# Patient Record
Sex: FEMALE | Race: WHITE
[De-identification: names, ages, dates, MRNs, and addresses within clinical notes are randomized per-mention and may not be internally consistent; named-entity substitution may affect disease eponyms.]

---

## 2018-02-25 ENCOUNTER — HOSPITAL ENCOUNTER (EMERGENCY)
Dept: HOSPITAL 63 - ER | Age: 68
Discharge: HOME | End: 2018-02-25
Payer: MEDICARE

## 2018-02-25 VITALS
SYSTOLIC BLOOD PRESSURE: 122 MMHG | SYSTOLIC BLOOD PRESSURE: 122 MMHG | SYSTOLIC BLOOD PRESSURE: 122 MMHG | DIASTOLIC BLOOD PRESSURE: 55 MMHG | DIASTOLIC BLOOD PRESSURE: 55 MMHG | DIASTOLIC BLOOD PRESSURE: 55 MMHG | DIASTOLIC BLOOD PRESSURE: 55 MMHG | SYSTOLIC BLOOD PRESSURE: 122 MMHG

## 2018-02-25 VITALS — BODY MASS INDEX: 18.61 KG/M2 | WEIGHT: 94.8 LBS | HEIGHT: 60 IN

## 2018-02-25 DIAGNOSIS — Z88.0: ICD-10-CM

## 2018-02-25 DIAGNOSIS — Z87.891: ICD-10-CM

## 2018-02-25 DIAGNOSIS — M94.0: ICD-10-CM

## 2018-02-25 DIAGNOSIS — I10: ICD-10-CM

## 2018-02-25 DIAGNOSIS — F32.9: ICD-10-CM

## 2018-02-25 DIAGNOSIS — E87.6: ICD-10-CM

## 2018-02-25 DIAGNOSIS — J44.1: Primary | ICD-10-CM

## 2018-02-25 DIAGNOSIS — G25.81: ICD-10-CM

## 2018-02-25 DIAGNOSIS — E78.5: ICD-10-CM

## 2018-02-25 LAB
ALBUMIN SERPL-MCNC: 3.4 G/DL (ref 3.4–5)
ALBUMIN/GLOB SERPL: 0.8 {RATIO} (ref 1–1.7)
ALP SERPL-CCNC: 115 U/L (ref 46–116)
ALT SERPL-CCNC: 38 U/L (ref 14–59)
ANION GAP SERPL CALC-SCNC: 10 MMOL/L (ref 6–14)
AST SERPL-CCNC: 35 U/L (ref 15–37)
BASOPHILS # BLD AUTO: 0 X10^3/UL (ref 0–0.2)
BASOPHILS NFR BLD: 0 % (ref 0–3)
BILIRUB SERPL-MCNC: 0.4 MG/DL (ref 0.2–1)
BUN/CREAT SERPL: 19 (ref 6–20)
CA-I SERPL ISE-MCNC: 19 MG/DL (ref 7–20)
CALCIUM SERPL-MCNC: 9 MG/DL (ref 8.5–10.1)
CHLORIDE SERPL-SCNC: 99 MMOL/L (ref 98–107)
CO2 SERPL-SCNC: 26 MMOL/L (ref 21–32)
CREAT SERPL-MCNC: 1 MG/DL (ref 0.6–1)
EOSINOPHIL NFR BLD: 0 % (ref 0–3)
EOSINOPHIL NFR BLD: 0 X10^3/UL (ref 0–0.7)
ERYTHROCYTE [DISTWIDTH] IN BLOOD BY AUTOMATED COUNT: 12.9 % (ref 11.5–14.5)
GFR SERPLBLD BASED ON 1.73 SQ M-ARVRAT: 55.6 ML/MIN
GLOBULIN SER-MCNC: 4.5 G/DL (ref 2.2–3.8)
GLUCOSE SERPL-MCNC: 114 MG/DL (ref 70–99)
HCT VFR BLD CALC: 37.9 % (ref 36–47)
HGB BLD-MCNC: 12.8 G/DL (ref 12–15.5)
INFLUENZA A PATIENT: NEGATIVE
INFLUENZA B PATIENT: NEGATIVE
LYMPHOCYTES # BLD: 0.8 X10^3/UL (ref 1–4.8)
LYMPHOCYTES NFR BLD AUTO: 8 % (ref 24–48)
MCH RBC QN AUTO: 30 PG (ref 25–35)
MCHC RBC AUTO-ENTMCNC: 34 G/DL (ref 31–37)
MCV RBC AUTO: 89 FL (ref 79–100)
MONO #: 1.3 X10^3/UL (ref 0–1.1)
MONOCYTES NFR BLD: 12 % (ref 0–9)
NEUT #: 8.4 X10^3UL (ref 1.8–7.7)
NEUTROPHILS NFR BLD AUTO: 80 % (ref 31–73)
PLATELET # BLD AUTO: 399 X10^3/UL (ref 140–400)
POTASSIUM SERPL-SCNC: 3.3 MMOL/L (ref 3.5–5.1)
PROT SERPL-MCNC: 7.9 G/DL (ref 6.4–8.2)
RBC # BLD AUTO: 4.24 X10^6/UL (ref 3.5–5.4)
SODIUM SERPL-SCNC: 135 MMOL/L (ref 136–145)
WBC # BLD AUTO: 10.6 X10^3/UL (ref 4–11)

## 2018-02-25 PROCEDURE — 83880 ASSAY OF NATRIURETIC PEPTIDE: CPT

## 2018-02-25 PROCEDURE — 80053 COMPREHEN METABOLIC PANEL: CPT

## 2018-02-25 PROCEDURE — 93005 ELECTROCARDIOGRAM TRACING: CPT

## 2018-02-25 PROCEDURE — 99285 EMERGENCY DEPT VISIT HI MDM: CPT

## 2018-02-25 PROCEDURE — 83605 ASSAY OF LACTIC ACID: CPT

## 2018-02-25 PROCEDURE — 87804 INFLUENZA ASSAY W/OPTIC: CPT

## 2018-02-25 PROCEDURE — 87040 BLOOD CULTURE FOR BACTERIA: CPT

## 2018-02-25 PROCEDURE — 85379 FIBRIN DEGRADATION QUANT: CPT

## 2018-02-25 PROCEDURE — 71045 X-RAY EXAM CHEST 1 VIEW: CPT

## 2018-02-25 PROCEDURE — 85025 COMPLETE CBC W/AUTO DIFF WBC: CPT

## 2018-02-25 PROCEDURE — 36415 COLL VENOUS BLD VENIPUNCTURE: CPT

## 2018-02-25 PROCEDURE — 96374 THER/PROPH/DIAG INJ IV PUSH: CPT

## 2018-02-25 PROCEDURE — 84484 ASSAY OF TROPONIN QUANT: CPT

## 2018-02-25 PROCEDURE — 96375 TX/PRO/DX INJ NEW DRUG ADDON: CPT

## 2018-02-25 NOTE — PHYS DOC
General


Chief Complaint:  COUGH


Stated Complaint:  CHEST WALL PAIN


Time Seen by MD:  02:28


Source:  patient


Exam Limitations:  clinical condition


Problems:  





History of Present Illness


Initial Comments


66 y/o female to ED complains of left-sided chest wall pain and worsening cough.


Patient has history of COPD and asthma, states she's been dealing with a cough 

now for several days. She ran out of DuoNeb treatments and Tessalon Perles are 

not helping. Tonight after a severe coughing spell she felt severe sharp pain 

between her ribs at the left side of her chest worse with deep breaths and 

certain movements. She's been struggling with dyspnea but denies any new 

symptoms tonight, no fever chills diaphoresis nausea vomiting arm or neck 

symptoms. On ED arrival vital signs are stable and although symptoms appear to 

be musculoskeletal full workup initiated.


Timing/Duration:  1 week


Severity:  severe


Modifying Factors:  worse with movement, improves with rest, improves with other


Associated Symptoms:  cough, shortness of breath, other


Allergies:  


Coded Allergies:  


     Penicillins (Verified  Allergy, Unknown, 2/25/18)





Past Medical History


Medical History:  COPD, other (depression, hypertension, asthma, restless leg, 

hyperlipidemia)


Surgical History:  other





Social History


Smoker:  quit greater than 1 year (quit smoking 16 years ago)


Alcohol:  rarely


Drugs:  none





Review of Systems


Constitutional:  denies chills, denies diaphoresis, denies fever, denies malaise


Respiratory:  see HPI, cough, shortness of breath, wheezing


Cardiovascular:  chest pain, denies palpitations, denies syncope


Gastrointestinal:  denies abdominal pain, denies nausea, denies vomiting


Musculoskeletal:  see HPI


Psychiatric/Neurological:  denies headache, denies numbness, denies paresthesia


Hematologic/Lymphatic:  denies blood clots, denies easy bleeding, denies easy 

bruising





Physical Exam


General Appearance:  moderate distress (severe coughing episodes and left-sided 

chest wall pain)


Ear, Nose, Throat:  hearing grossly normal, normal ENT inspection (dry membranes

)


Neck:  non-tender, supple


Respiratory:  decreased breath sounds, wheezing, other (point tender at the 

costal cartilage left side sixth rib interspace identically reproducing chief 

complaint)


Cardiovascular:  normal peripheral pulses, regular rate, rhythm


Gastrointestinal:  non tender, soft


Back:  no CVA tenderness, no vertebral tenderness


Extremities:  non-tender, normal inspection


Neurologic/Psychiatric:  CNs II-XII nml as tested, no motor/sensory deficits, 

alert, oriented x 3





Orders, Labs, Meds


K+ 3.3, 20meq KCL given PO





AP chest: Hyperinflation with COPD changes noted no discrete infiltrates 

interpreted by me.





Labs unremarkable





0436:  Patient rechecked, cough/wheeze resolved completely with solumedrol.  

Costal cartilage pain resolved with toradol patient states it is 1/10.  

Requesting discharge. I discussed prescription and over-the-counter medications 

as well as home medications. Discussed close PCP follow-up patient's questions 

were answered she expressed agreement and understanding with the treatment plan.


Departure


Time of Disposition:  04:35


Disposition:  01 HOME, SELF-CARE


Diagnosis:  COPD exacerbation, Costochondritis, hypokalem


Condition:  IMPROVED


Patient Instructions:  Costochondritis, Easy-to-Read, Hypokalemia-Brief





Additional Instructions:  


Please review the patient education materials given by ED staff.


ED one banana daily to supplement potassium.


Use home albuterol as needed for cough.


Prescription: Prednisone, DuoNeb, guaifenesin with codeine syrup


Follow-up with your doctor in 3-5 days for recheck.


Return to ED with new or changing symptoms.











SHERLY CONNOLLY DO Feb 25, 2018 02:35

## 2018-02-25 NOTE — RAD
Indication: Chest pain, cough.



Technique: Portable AP upright chest x-ray



Comparison: Previous study from 7/18/2016



Findings:

Heart is normal in size.  Lungs are hyperinflated.  Diffuse bilateral coarse

interstitial opacities noted without focal consolidation.  Redemonstrated are

couple of small radiopaque nodules in the right midlung zone also seen on

previous study from 2016.  No pneumothorax or pleural effusion.  Visualized

bony thorax is within normal limits.



Impression:

1.  No acute cardiopulmonary process.  Chronic interstitial changes. 

Superimposed interstitial infection not ruled out.

2.  Stable couple of nodules in the right midlung zone also seen on previous

CT chest from study from 2014 compatible with calcified granulomas.

## 2018-02-25 NOTE — EKG
[facility] [address], [place] [address]

Test Date:    2018               Test Time:    03:05:48

Pat Name:     RYNE BERTRAND           Department:   

Patient ID:   [id number]           Room:          

Gender:       F                        Technician:   JIM

:          1950               Requested By: SHERLY CONNOLLY

Order Number: [id number]            Reading MD:     

                                 Measurements

Intervals                              Axis          

Rate:         90                       P:            67

MT:           116                      QRS:          85

QRSD:         112                      T:            23

QT:           380                                    

QTc:          469                                    

                           Interpretive Statements

SINUS RHYTHM

R-S TRANSITION ZONE IN V LEADS DISPLACED TO THE RIGHT

QRS(T) CONTOUR ABNORMALITY

CONSIDER ANTEROLATERAL MYOCARDIAL DAMAGE

T ABNORMALITY IN ANTEROSEPTAL LEADS

ABNORMAL ECG

RI6.01

No previous ECG available for comparison

## 2018-08-27 ENCOUNTER — HOSPITAL ENCOUNTER (OUTPATIENT)
Dept: HOSPITAL 63 - DXRAD | Age: 68
Discharge: HOME | End: 2018-08-27
Attending: FAMILY MEDICINE
Payer: COMMERCIAL

## 2018-08-27 DIAGNOSIS — J43.9: Primary | ICD-10-CM

## 2018-08-27 DIAGNOSIS — E78.5: ICD-10-CM

## 2018-08-27 DIAGNOSIS — Z87.891: ICD-10-CM

## 2018-08-27 DIAGNOSIS — I10: ICD-10-CM

## 2018-08-27 PROCEDURE — 71046 X-RAY EXAM CHEST 2 VIEWS: CPT

## 2018-08-28 NOTE — RAD
Chest, 2 views, 8/27/2018:

 

HISTORY: Unexplained weight loss

 

Comparison is made to a study from 2/25/2018. The heart size is normal. 

There are emphysematous changes in the lungs with mild parenchymal 

scarring. A nodule projected over the anterior aspect of the right lung on

the lateral view is unchanged since 7/18/2016. The CT study from 5/1/2014 

shows that this is a bilobed nodule with areas of dense central 

calcification compatible with old granulomatous disease. No acute 

infiltrate is seen. There is no evidence of pleural fluid. Moderate 

scattered spurs are present in the spine.

 

IMPRESSION:

1. Emphysema.

2. Old healed granulomatous disease in the chest.

3. No acute abnormality is detected.

 

Electronically signed by: Rick Moritz, MD (8/28/2018 8:18 AM) [facility]

## 2018-10-01 ENCOUNTER — HOSPITAL ENCOUNTER (OUTPATIENT)
Dept: HOSPITAL 63 - PMG | Age: 68
Discharge: HOME | End: 2018-10-01
Attending: FAMILY MEDICINE
Payer: COMMERCIAL

## 2018-10-01 DIAGNOSIS — R20.0: Primary | ICD-10-CM

## 2018-10-01 PROCEDURE — 73502 X-RAY EXAM HIP UNI 2-3 VIEWS: CPT

## 2018-10-01 PROCEDURE — 73552 X-RAY EXAM OF FEMUR 2/>: CPT

## 2018-10-01 NOTE — RAD
EXAM: Left hip, 2 views; left femur, 2 views.

 

HISTORY: Pain and numbness.

 

COMPARISON: None.

 

FINDINGS: Frontal and frog-leg views left hip and frontal and lateral 

views left femur are obtained. There is no fracture, dislocation or 

subluxation. No lytic or sclerotic osseous lesion is seen. There is no 

periosteal reaction.

 

IMPRESSION: No acute osseous finding.

 

Electronically signed by: Leonora Sawant MD (10/1/2018 1:33 PM) [place] [id number]

## 2018-10-01 NOTE — RAD
EXAM: Left hip, 2 views; left femur, 2 views.

 

HISTORY: Pain and numbness.

 

COMPARISON: None.

 

FINDINGS: Frontal and frog-leg views left hip and frontal and lateral 

views left femur are obtained. There is no fracture, dislocation or 

subluxation. No lytic or sclerotic osseous lesion is seen. There is no 

periosteal reaction.

 

IMPRESSION: No acute osseous finding.

 

Electronically signed by: Leonora Sawant MD (10/1/2018 1:33 PM) [place] [id number]

## 2020-07-20 ENCOUNTER — HOSPITAL ENCOUNTER (OUTPATIENT)
Dept: HOSPITAL 63 - LAB | Age: 70
Discharge: HOME | End: 2020-07-20
Attending: NURSE ANESTHETIST, CERTIFIED REGISTERED
Payer: COMMERCIAL

## 2020-07-20 DIAGNOSIS — Z01.818: Primary | ICD-10-CM

## 2020-07-20 DIAGNOSIS — K22.70: ICD-10-CM

## 2020-07-20 DIAGNOSIS — Z11.59: ICD-10-CM

## 2020-07-20 PROCEDURE — 36415 COLL VENOUS BLD VENIPUNCTURE: CPT

## 2020-07-23 ENCOUNTER — HOSPITAL ENCOUNTER (OUTPATIENT)
Dept: HOSPITAL 63 - SURG | Age: 70
Discharge: HOME | End: 2020-07-23
Attending: INTERNAL MEDICINE
Payer: MEDICARE

## 2020-07-23 VITALS — DIASTOLIC BLOOD PRESSURE: 65 MMHG | SYSTOLIC BLOOD PRESSURE: 121 MMHG

## 2020-07-23 DIAGNOSIS — R63.4: ICD-10-CM

## 2020-07-23 DIAGNOSIS — K22.2: ICD-10-CM

## 2020-07-23 DIAGNOSIS — K22.70: ICD-10-CM

## 2020-07-23 DIAGNOSIS — K29.50: Primary | ICD-10-CM

## 2020-07-23 DIAGNOSIS — Z79.899: ICD-10-CM

## 2020-07-23 DIAGNOSIS — Z88.0: ICD-10-CM

## 2020-07-23 DIAGNOSIS — K44.9: ICD-10-CM

## 2020-07-23 PROCEDURE — 43239 EGD BIOPSY SINGLE/MULTIPLE: CPT

## 2020-07-23 PROCEDURE — 43450 DILATE ESOPHAGUS 1/MULT PASS: CPT

## 2020-11-02 ENCOUNTER — HOSPITAL ENCOUNTER (OUTPATIENT)
Dept: HOSPITAL 63 - ER | Age: 70
Setting detail: OBSERVATION
LOS: 2 days | Discharge: HOME | End: 2020-11-04
Attending: HOSPITALIST | Admitting: HOSPITALIST
Payer: COMMERCIAL

## 2020-11-02 VITALS — SYSTOLIC BLOOD PRESSURE: 141 MMHG | DIASTOLIC BLOOD PRESSURE: 57 MMHG

## 2020-11-02 VITALS — SYSTOLIC BLOOD PRESSURE: 104 MMHG | DIASTOLIC BLOOD PRESSURE: 57 MMHG

## 2020-11-02 VITALS — HEIGHT: 60 IN | WEIGHT: 73.63 LBS | BODY MASS INDEX: 14.46 KG/M2

## 2020-11-02 DIAGNOSIS — E78.5: ICD-10-CM

## 2020-11-02 DIAGNOSIS — F41.8: ICD-10-CM

## 2020-11-02 DIAGNOSIS — J44.9: ICD-10-CM

## 2020-11-02 DIAGNOSIS — R41.0: Primary | ICD-10-CM

## 2020-11-02 DIAGNOSIS — M19.90: ICD-10-CM

## 2020-11-02 DIAGNOSIS — I10: ICD-10-CM

## 2020-11-02 DIAGNOSIS — G30.9: ICD-10-CM

## 2020-11-02 DIAGNOSIS — K21.9: ICD-10-CM

## 2020-11-02 DIAGNOSIS — Z87.891: ICD-10-CM

## 2020-11-02 DIAGNOSIS — R44.3: ICD-10-CM

## 2020-11-02 DIAGNOSIS — Z98.891: ICD-10-CM

## 2020-11-02 DIAGNOSIS — R42: ICD-10-CM

## 2020-11-02 DIAGNOSIS — R53.1: ICD-10-CM

## 2020-11-02 DIAGNOSIS — F10.20: ICD-10-CM

## 2020-11-02 DIAGNOSIS — R62.7: ICD-10-CM

## 2020-11-02 DIAGNOSIS — R63.4: ICD-10-CM

## 2020-11-02 DIAGNOSIS — R29.6: ICD-10-CM

## 2020-11-02 DIAGNOSIS — E46: ICD-10-CM

## 2020-11-02 LAB
ALBUMIN SERPL-MCNC: 3.6 G/DL (ref 3.4–5)
ALBUMIN/GLOB SERPL: 1.1 {RATIO} (ref 1–1.7)
ALP SERPL-CCNC: 113 U/L (ref 46–116)
ALT SERPL-CCNC: 51 U/L (ref 14–59)
AMPHETAMINE/METHAMPHETAMINE: (no result)
ANION GAP SERPL CALC-SCNC: 8 MMOL/L (ref 6–14)
APTT PPP: (no result) S
AST SERPL-CCNC: 36 U/L (ref 15–37)
BACTERIA #/AREA URNS HPF: 0 /HPF
BARBITURATES UR-MCNC: (no result) UG/ML
BASOPHILS # BLD AUTO: 0 X10^3/UL (ref 0–0.2)
BASOPHILS NFR BLD: 1 % (ref 0–3)
BENZODIAZ UR-MCNC: (no result) UG/L
BILIRUB SERPL-MCNC: 0.2 MG/DL (ref 0.2–1)
BILIRUB UR QL STRIP: (no result)
BUN/CREAT SERPL: 26 (ref 6–20)
CA-I SERPL ISE-MCNC: 18 MG/DL (ref 7–20)
CALCIUM SERPL-MCNC: 9.2 MG/DL (ref 8.5–10.1)
CANNABINOIDS UR-MCNC: (no result) UG/L
CHLORIDE SERPL-SCNC: 105 MMOL/L (ref 98–107)
CO2 SERPL-SCNC: 30 MMOL/L (ref 21–32)
COCAINE UR-MCNC: (no result) NG/ML
CREAT SERPL-MCNC: 0.7 MG/DL (ref 0.6–1)
EOSINOPHIL NFR BLD: 0.3 X10^3/UL (ref 0–0.7)
EOSINOPHIL NFR BLD: 6 % (ref 0–3)
ERYTHROCYTE [DISTWIDTH] IN BLOOD BY AUTOMATED COUNT: 14.1 % (ref 11.5–14.5)
FIBRINOGEN PPP-MCNC: CLEAR MG/DL
GFR SERPLBLD BASED ON 1.73 SQ M-ARVRAT: 83.2 ML/MIN
GLOBULIN SER-MCNC: 3.4 G/DL (ref 2.2–3.8)
GLUCOSE SERPL-MCNC: 100 MG/DL (ref 70–99)
GLUCOSE UR STRIP-MCNC: (no result) MG/DL
HCT VFR BLD CALC: 41.4 % (ref 36–47)
HGB BLD-MCNC: 13.3 G/DL (ref 12–15.5)
LYMPHOCYTES # BLD: 1.1 X10^3/UL (ref 1–4.8)
LYMPHOCYTES NFR BLD AUTO: 24 % (ref 24–48)
MCH RBC QN AUTO: 30 PG (ref 25–35)
MCHC RBC AUTO-ENTMCNC: 32 G/DL (ref 31–37)
MCV RBC AUTO: 94 FL (ref 79–100)
METHADONE SERPL-MCNC: (no result) NG/ML
MONO #: 0.5 X10^3/UL (ref 0–1.1)
MONOCYTES NFR BLD: 12 % (ref 0–9)
NEUT #: 2.5 X10^3UL (ref 1.8–7.7)
NEUTROPHILS NFR BLD AUTO: 56 % (ref 31–73)
NITRITE UR QL STRIP: (no result)
OPIATES UR-MCNC: (no result) NG/ML
PCP SERPL-MCNC: (no result) MG/DL
PLATELET # BLD AUTO: 265 X10^3/UL (ref 140–400)
POTASSIUM SERPL-SCNC: 3.6 MMOL/L (ref 3.5–5.1)
PROT SERPL-MCNC: 7 G/DL (ref 6.4–8.2)
RBC # BLD AUTO: 4.4 X10^6/UL (ref 3.5–5.4)
RBC #/AREA URNS HPF: 0 /HPF (ref 0–2)
SODIUM SERPL-SCNC: 143 MMOL/L (ref 136–145)
SP GR UR STRIP: 1.02
SQUAMOUS #/AREA URNS LPF: (no result) /LPF
UROBILINOGEN UR-MCNC: 0.2 MG/DL
WBC # BLD AUTO: 4.4 X10^3/UL (ref 4–11)
WBC #/AREA URNS HPF: 0 /HPF (ref 0–4)

## 2020-11-02 PROCEDURE — 84484 ASSAY OF TROPONIN QUANT: CPT

## 2020-11-02 PROCEDURE — 99285 EMERGENCY DEPT VISIT HI MDM: CPT

## 2020-11-02 PROCEDURE — 81001 URINALYSIS AUTO W/SCOPE: CPT

## 2020-11-02 PROCEDURE — 96360 HYDRATION IV INFUSION INIT: CPT

## 2020-11-02 PROCEDURE — G0379 DIRECT REFER HOSPITAL OBSERV: HCPCS

## 2020-11-02 PROCEDURE — 94640 AIRWAY INHALATION TREATMENT: CPT

## 2020-11-02 PROCEDURE — 85025 COMPLETE CBC W/AUTO DIFF WBC: CPT

## 2020-11-02 PROCEDURE — 71045 X-RAY EXAM CHEST 1 VIEW: CPT

## 2020-11-02 PROCEDURE — 70450 CT HEAD/BRAIN W/O DYE: CPT

## 2020-11-02 PROCEDURE — 96361 HYDRATE IV INFUSION ADD-ON: CPT

## 2020-11-02 PROCEDURE — G0378 HOSPITAL OBSERVATION PER HR: HCPCS

## 2020-11-02 PROCEDURE — 80307 DRUG TEST PRSMV CHEM ANLYZR: CPT

## 2020-11-02 PROCEDURE — 93005 ELECTROCARDIOGRAM TRACING: CPT

## 2020-11-02 PROCEDURE — 36415 COLL VENOUS BLD VENIPUNCTURE: CPT

## 2020-11-02 PROCEDURE — 80053 COMPREHEN METABOLIC PANEL: CPT

## 2020-11-02 NOTE — PHYS DOC
Past History


Past Medical History:  COPD, Hypertension


Past Surgical History:  


Smoking:  Non-smoker


Alcohol Use:  Heavy


Drug Use:  None





General Adult


EDM:


Chief Complaint:  DIZZY/LIGHT HEADED





HPI:


HPI:


70-year-old female presents with dizziness and 2 falls today.  Patient tells me 

that she has had intermittent dizziness which she describes as a feeling of 

motion.  She has fallen down 2 times today.  She has gotten very weak and unable

to keep her self standing up.  She states that she had a similar episode to this

a year ago and they did not figure out what was causing it.  The patient states 

that she feels shaky all over.  She is wondering about Parkinson's.  There is no

family history of Parkinson's.  She has never been diagnosed with Parkinson's.  

She further admits to seeing moving shadows at night.  She knows that they are 

not there.  She does not describe any specific figures.  The patient is a daily 

alcohol drinker.  She tells me that she drinks 2 beers a day and the occasional 

bloody Violet.  Her last drink was yesterday.  She denies fever or chills.  Family

called the emergency room and is also concerned about Parkinson's.  They state 

she also has audible hallucinations.  Today family found her outside of the 

house where she lives with her son.  She was in the yard on the ground and could

not stand up.





Review of Systems:


Review of Systems:


Constitutional:  Denies fever or chills.  Generalized weakness.


Eyes:  Denies change in visual acuity 


HENT:  Denies nasal congestion or sore throat 


Respiratory:  Denies cough or shortness of breath 


Cardiovascular:  Denies chest pain or edema 


GI:  Denies abdominal pain, nausea, vomiting, bloody stools or diarrhea 


: Denies dysuria 


Musculoskeletal:  Denies back pain or joint pain 


Integument:  Denies rash 


Neurologic: Dizziness.  Denies headache, focal weakness or sensory changes 


Endocrine:  Denies polyuria or polydipsia 


Lymphatic:  Denies swollen glands 


Psychiatric:   anxiety





Current Medications:


Current Meds:





Current Medications








 Medications


  (Trade)  Dose


 Ordered  Sig/Tamia  Start Time


 Stop Time Status Last Admin


Dose Admin


 


 Sodium Chloride  1,000 ml @ 


 1,000 mls/hr  1X  ONCE  20 15:15


 20 16:14   














Allergies:


Allergies:





Allergies








Coded Allergies Type Severity Reaction Last Updated Verified


 


  Penicillins Allergy Unknown  18 Yes











Physical Exam:


PE:





Constitutional: Well developed, thin, no acute distress, non-toxic appearance. 

[]


HENT: Normocephalic, atraumatic, bilateral external ears normal, oropharynx 

moist, no oral exudates, nose normal. []


Eyes: PERRLA, EOMI, conjunctiva normal, no discharge. [] 


Neck: Normal range of motion, no tenderness, supple, no stridor. [] 


Cardiovascular: Heart rate regular rhythm, no murmur []


Lungs & Thorax:  Bilateral breath sounds clear to auscultation []


Abdomen: Bowel sounds normal, soft, no tenderness, no masses, no pulsatile m

asses. [] 


Skin: Warm, dry, no erythema, no rash. [] 


Back: No tenderness, no CVA tenderness. [] 


Extremities: No tenderness, no cyanosis, no clubbing, ROM intact, no edema. [] 


Neurologic: Alert and oriented X 3, normal motor function, normal sensory 

function, no focal deficits noted. []


Psychologic: Affect normal, judgement normal, mood anxious. []





Current Patient Data:


Vital Signs:





                                   Vital Signs








  Date Time  Temp Pulse Resp B/P (MAP) Pulse Ox O2 Delivery O2 Flow Rate FiO2


 


20 15:24 97.5 105 20 178/83 (114) 93 Room Air  











EKG:


EKG:


[]





Radiology/Procedures:


Radiology/Procedures:


[]


Impressions:


CT HEAD


 


INDICATION: Reason: recent fall, dizzy, etoh / Spl. Instructions:  / 


History: 


 


COMPARISON: None Available.


 


Exposure: One or more of the following individualized dose reduction 


techniques were utilized for this examination:  1. Automated exposure 


control  2. Adjustment of the mA and/or kV according to patient size  3. 


Use of iterative reconstruction technique


 


TECHNIQUE: 5 mm contiguous axial images were obtained from the skull base 


to the vertex in both bone and soft tissue algorithm.  


 


FINDINGS: 


 


No abnormal attenuation within the brain parenchyma.  


 


No evidence of acute intracranial hemorrhage.   No extra-axial fluid 


collections.


 


No mass effect or midline shift. Ventricular size is appropriate.  Basal 


cisterns are patent.


 


No fractures identified.Gray-white differentiation is preserved.Globes and


orbits are within normal limits.   Paranasal sinuses and mastoid air cells


are clear.   


 


IMPRESSION:


 


No acute intracranial findings. 


 


Electronically signed by: Kartik Lozada MD (2020 4:00 PM) [id number]














DICTATED AND SIGNED BY:     KARTIK LOZADA MD


DATE:     20 1600





CC: APRIL MAGAÑA DO; CIARAN WARE MD ~





Heart Score:


Risk Factors:


Risk Factors:  DM, Current or recent (<one month) smoker, HTN, HLP, family 

history of CAD, obesity.


Risk Scores:


Score 0 - 3:  2.5% MACE over next 6 weeks - Discharge Home


Score 4 - 6:  20.3% MACE over next 6 weeks - Admit for Clinical Observation


Score 7 - 10:  72.7% MACE over next 6 weeks - Early Invasive Strategies





Course & Med Decision Making:


Course & Med Decision Making


Pertinent Labs and Imaging studies reviewed. (See chart for details)


The patient's labs are unremarkable.  Her head CT is negative for acute 

findings.  Her urinalysis is negative for infection or drugs.  The patient's 

symptoms could have a central cause, but they could also be related to her 

alcohol consumption.  At this time it does not appear that she can function at 

home.  I spoke with the hospitalist, Dr. Moore and he has accepted the patient for 

admission.  He will have physical therapy and likely neurology see the patient 

for more in-depth evaluation of her mental status and hallucinations.


[]





Dragon Disclaimer:


Dragon Disclaimer:


This electronic medical record was generated, in whole or in part, using a voice

 recognition dictation system.





Departure


Departure:


Impression:  


   Primary Impression:  


   Dizziness


   Additional Impression:  


   Hallucinations


Disposition:   ADMITTED AS INPT THIS HOSP


Admitting Physician:  Daniel Moore


Condition:  STABLE


Referrals:  


CIARAN WARE MD (PCP)











APRIL MAGAÑA DO                  2020 15:38

## 2020-11-02 NOTE — RAD
CT HEAD

 

INDICATION: Reason: recent fall, dizzy, etoh / Spl. Instructions:  / 

History: 

 

COMPARISON: None Available.

 

Exposure: One or more of the following individualized dose reduction 

techniques were utilized for this examination:  1. Automated exposure 

control  2. Adjustment of the mA and/or kV according to patient size  3. 

Use of iterative reconstruction technique

 

TECHNIQUE: 5 mm contiguous axial images were obtained from the skull base 

to the vertex in both bone and soft tissue algorithm.  

 

FINDINGS: 

 

No abnormal attenuation within the brain parenchyma.  

 

No evidence of acute intracranial hemorrhage.   No extra-axial fluid 

collections.

 

No mass effect or midline shift. Ventricular size is appropriate.  Basal 

cisterns are patent.

 

No fractures identified.Gray-white differentiation is preserved.Globes and

orbits are within normal limits.   Paranasal sinuses and mastoid air cells

are clear.   

 

IMPRESSION:

 

No acute intracranial findings. 

 

Electronically signed by: Kartik Lozada MD (11/2/2020 4:00 PM) [id number]

## 2020-11-02 NOTE — NUR
The patient, RYNE BERTRAND, 69 y/o, F admitted by CINTHYA CRUZ MD, was given written 
information regarding hospital policies, unit procedures and contact persons.  



Valuables were checked and documented. Vitals are stable. Pt is A&O and able to express any 
concerns she has. Pt had no complaints upon admission. Pt is currently resting in bed. Will 
continue to monitor.

## 2020-11-02 NOTE — EKG
[facility] [address], [place] [address]

Test Date:    2020               Test Time:    15:25:26

Pat Name:     RYNE BERTRAND           Department:   

Patient ID:   [id number]           Room:         109 A

Gender:       F                        Technician:   

:          1950               Requested By: APRIL MAGAÑA

Order Number: [id number]            Reading MD:   Abbe Contreras

                                 Measurements

Intervals                              Axis          

Rate:         100                      P:            90

WI:           144                      QRS:          100

QRSD:         106                      T:            34

QT:           378                                    

QTc:          491                                    

                           Interpretive Statements

SINUS RHYTHM

RIGHTWARD AXIS

RIGHT BUNDLE BRANCH BLOCK

PROLONGED QT

Electronically Signed On 11-3-2020 10:35:12 CST by Abbe Contreras

## 2020-11-02 NOTE — RAD
EXAM: CHEST 1 VIEW 

 

History: Dizziness 

 

COMPARISON: 8/27/2018

 

TECHNIQUE: Single portable radiograph of the chest

 

FINDINGS:  The cardiac silhouette is unremarkable. Mild prominent 

bilateral interstitial lung markings likely chronic interstitial changes. 

There is a bilobed 1.7 cm nodule identified in the right lower lobe of the

lung stable since prior exam. 

 

 

 

IMPRESSION:  Mild prominent prevention lung markings likely chronic 

interstitial changes or interstitial infiltrates or edema.

 

 

Electronically signed by: Kartik Lozada MD (11/2/2020 3:44 PM) [id number]

## 2020-11-03 VITALS — SYSTOLIC BLOOD PRESSURE: 136 MMHG | DIASTOLIC BLOOD PRESSURE: 69 MMHG

## 2020-11-03 VITALS — DIASTOLIC BLOOD PRESSURE: 63 MMHG | SYSTOLIC BLOOD PRESSURE: 124 MMHG

## 2020-11-03 VITALS — SYSTOLIC BLOOD PRESSURE: 109 MMHG | DIASTOLIC BLOOD PRESSURE: 65 MMHG

## 2020-11-03 VITALS — DIASTOLIC BLOOD PRESSURE: 78 MMHG | SYSTOLIC BLOOD PRESSURE: 137 MMHG

## 2020-11-03 VITALS — DIASTOLIC BLOOD PRESSURE: 70 MMHG | SYSTOLIC BLOOD PRESSURE: 126 MMHG

## 2020-11-03 RX ADMIN — ASPIRIN 81 MG SCH MG: 81 TABLET ORAL at 07:50

## 2020-11-03 RX ADMIN — CETIRIZINE HYDROCHLORIDE SCH MG: 10 TABLET, FILM COATED ORAL at 07:50

## 2020-11-03 RX ADMIN — BUPROPION HYDROCHLORIDE SCH MG: 300 TABLET, EXTENDED RELEASE ORAL at 07:50

## 2020-11-03 RX ADMIN — PANTOPRAZOLE SODIUM SCH MG: 40 TABLET, DELAYED RELEASE ORAL at 07:50

## 2020-11-03 NOTE — HP
ADMIT DATE:  2020



ATTENDING PHYSICIAN:  Dr. Cruz.



CHIEF COMPLAINT:  Frequent falls and confusion.



HISTORY OF PRESENT ILLNESS:  The patient is a 70-year-old female admitted

through the ED with intermittent dizziness and unsteadiness for 1 day.  She

tried to take her dog out to the groomer.  She fell, did not break anything. 

She was very confused, weak all over.  She denies headache, chest pain,

shortness of breath.  No COVID exposure.  No absolute infection.  She has a past

medical history significant for COPD, hypertension.  She has also had

generalized weakness, confusion, weight loss and major depression.  In the ED,

the workup was fairly unremarkable.  CT of the head demonstrated no acute

strokes.  Chest x-ray was clear.  She was admitted for further evaluation and

treatment and neurologic consultation.



PAST MEDICAL HISTORY:  Significant for COPD.  She quit smoking 10 years ago,

hypertension, recent weight loss, gastroesophageal reflux disease.



PAST SURGICAL HISTORY:   section.



SOCIAL HISTORY:  She recently moved in with her son, whether the son moved in

with her remains to be seen.  He does not have a job.  She still is employed as

an employee at [employer].  She said she quit smoking many years ago.  She drinks

1-2 beers daily.  She may have underestimated.



CURRENT MEDICATIONS:  Include Zyrtec, Protonix, bupropion, aspirin daily.



ALLERGIES:  SHE HAS ALLERGIES TO PENICILLIN, WHICH CAUSES A RASH.



REVIEW OF SYSTEMS:  Significant for the weight loss.  She has some constipation,

says her stools are hard.  No fevers, chills, pain, headache, nausea.  All other

systems reviewed and turned to be negative.



PHYSICAL EXAMINATION:

GENERAL:  When I saw her, this is a pleasant, thin female.

INITIAL VITAL SIGNS:  Showed a blood pressure 137/78, pulse was 86 and regular. 

She was afebrile, oxygen saturation 95% on room air.  Her weight was 36

kilograms, which adds up to about 80 pounds.

HEENT:  Head is without trauma.  Pupils are reactive.  Sclerae nonicteric. 

Oropharynx clear.

NECK:  Supple, no bruits.

LUNGS:  Otherwise, clear.

CARDIOVASCULAR:  Showed regular heart tones.  No gallops.  Peripheral pulses are

palpable and full.

ABDOMEN:  Soft, scaphoid, nontender, no organomegaly.  Bowel sounds are

hypoactive.

EXTREMITIES:  Showed no cyanosis or edema.

NEUROLOGIC:  Speech is fluent.   intact and symmetrical.  There is no

confusion.  She was fairly alert and gave succinct history.



PERTINENT LABORATORY STUDIES:  The urinalysis is unremarkable.  Hemoglobin

maintained at 13.3 g/dL with white count of 4400.  Electrolytes are within

normal range.  Creatinine 0.7 mg percent, nonfasting blood sugar 100.  Cardiac

enzymes negative.  CT of the head demonstrated no acute strokes or bleeds. 

Chest x-ray, chronic interstitial changes without any acute infiltrates.



ASSESSMENT:

1.  A 70-year-old female with increased confusion, dizziness and falling.  She

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

2.  History of chronic alcoholism.  Whether she drinks enough to warrant a

diagnosis of Wernicke-Korsakoff syndrome remains to be seen.  Clinically, she is

not that bad yet.

3.  Chronic obstructive pulmonary disease as evidenced by her chest x-ray.  She

has hyperexpansion.

4.  Protein-calorie malnutrition.

5.  Weight loss due to her chronic obstructive pulmonary disease.  Her work of

breathing constitutes a significant amount of her energy expansion.

6.  Underlying depression with anxiety.

7.  Gastroesophageal reflux disease.



PLAN:

1.  Full neurologic consultation.

2.  Physical therapy recommendation.

3.  Long discussion with family short-term and long-term goals.





______________________________

CINTHYA CRUZ MD



DR:  KRISTY/frank  JOB#:  [id number] / [id number]

DD:  2020 10:05  DT:  2020 11:11

## 2020-11-03 NOTE — NUR
pt stated this evening that she has fallen multiple times recently, she cant stop shaking 
and that she dropped her dog off at the groomer and forgot how to get back to pick her dog 
up.

## 2020-11-03 NOTE — NUR
End of shift



Pt is A&O x4, but forgetful. She is up ad keith in room, with a FWW. No oxygen demands. Denies 
pain in any location. LBM is 10/31. Cont B&B. Skin is CDI with ecchymosis from falls et ASA 
use. No new issues identified this shift.

## 2020-11-03 NOTE — CONS
DATE OF CONSULTATION:  



NEUROLOGY CONSULTATION



REFERRING PHYSICIAN:  Dr. Moore.



REASON FOR CONSULTATION:  Frequent falls and unsteady gait.



HISTORY OF PRESENT ILLNESS:  This is a 70-year-old right-handed  female

was admitted yesterday through Emergency Room after she presented with chief

complaints of intermittent dizziness described as unsteadiness for 1 day.  The

patient stated she tried to get to the groomer of dog, she missed direction and

spent half hours to reach the place.  However, the patient was able to drive

back home with the dog without any problem.  Yesterday, she was working on the

dog chain and all of a sudden she felt weak all over and shaking.  She was not

able to stand up and she fell twice.  She was found by her family members on the

ground outside the house.  The patient stated she was weak all over and she

could not stand up.  She denies headaches, chest pain, shortness of breath or

palpitation.  The patient stated in the last few days when she woke up in the

morning, she sees spots for short period of time.  She denies visual or auditory

hallucinations.  She had a similar episode a year ago.  On occasion, she

complains of numbness and paresthesia of the legs and sometimes she gets

forgetful.  She denies any head injuries.  Initial nonenhanced head CT scan

revealed no acute intracranial process.



PAST MEDICAL HISTORY:  Significant for COPD, hypertension, the patient with

recent weight loss and generalized weakness, GERD and depression.



PAST SURGICAL HISTORY:  Positive for .



SOCIAL HISTORY:  The patient lives with her son at home.  She denies smoking,

but she drinks 1-2 beers daily.



FAMILY HISTORY:  Noncontributory; however, father had pancreatic cancer and

mother had heart disease.



CURRENT MEDICATIONS:  Include Zyrtec 10 mg daily, pantoprazole 40 mg daily,

bupropion 300 mg daily, aspirin 81 mg daily.



ALLERGIES:  PENICILLIN.



REVIEW OF SYSTEMS:  A 12-point review of system was performed as mentioned above

in history of present illness, otherwise unremarkable.



PHYSICAL EXAMINATION:

GENERAL:  Very thin white female, not in acute distress.  She weighs 35.6 kilos.

VITAL SIGNS:  Blood pressure 137/78, respiratory rate 20, pulse is 85 and

regular, temperature 98.1, oxygen saturation is 95% on room air.

HEENT:  Normocephalic, atraumatic, otherwise unremarkable.

NECK:  Supple.  Negative for carotid bruit, lymphadenopathy or thyromegaly.

LUNGS:  Clear to A and P.

CARDIOVASCULAR:  Regular rate and rhythm, normal S1, S2.  There is no S3, S4 or

murmur.

ABDOMEN:  Soft.  Bowel sounds positive.

EXTREMITIES:  Negative for cyanosis, clubbing or pitting edema.

NEUROLOGICAL:  MENTAL STATUS:  The patient is alert and oriented x 3.  Speech is

fluent.  There is no language dysfunction.  She recalls 3/3 immediately and 2/3

after 3 minutes.  Judgment and abstract thinking are normal.  The patient denies

hallucination or delusion.

CRANIAL NERVES:  Visual fields are full.  The pupils are reactive to light and

accommodation.  The extraocular movements are intact.  There is no nystagmus. 

There is no facial motor or sensory deficit.  Hearing is diminished bilaterally.

 The palate is elevated symmetrically.  Sternocleidomastoid muscles are powerful

bilaterally.  The patient shrugs her shoulders symmetrically, protrudes her

tongue in the midline without fasciculation or atrophy.

MOTOR EXAMINATION:  No focal muscle bulk was seen.  The tone is normal.  The

strength is 5/5 throughout.

SENSORY EXAMINATION:  Revealed normal pinprick, light touch, vibratory and

position senses.  Deep tendon reflexes were symmetric and hypoactive with absent

Achilles responses.

GAIT:  The stance is steady.  The patient makes a few steps without assistance.



DIAGNOSTIC DATA:  A nonenhanced head CT scan revealed no evidence of acute

intracranial process.  Chest x-ray revealed chronic interstitial changes.



LABORATORY DATA:  CBC revealed white blood cells of 4.4 thousand, hemoglobin

13.3, hematocrit 41.4, platelet count 265,000.  Chemistry revealed sodium 143,

potassium 3.6, chloride 105, CO2 of 30, BUN 18, creatinine 0.7, glucose 100,

calcium 9.2.  Liver enzymes are normal.  Troponin level is normal.  Urinalysis

is negative for urinary tract infection.  Urine drug screen is negative.



IMPRESSION:

1.  Generalized weakness.

2.  Difficult to thrive with history of weight loss of unknown etiology.

3.  Recent frequent falls, likely due to generalized weakness and possible

partial hypertension.

4.  History of hypertension, anxiety and moderate depression.



RECOMMENDATIONS:

1.  Continue with current medical care.

2.  Physical therapy evaluation.

3.  Possible need workup for weight loss and dietitian consultations.





______________________________

M JIMI UREÑA MD



DR:  MARLENA/frank  JOB#:  [id number] / [id number]

DD:  2020 07:56  DT:  2020 09:33

## 2020-11-04 VITALS
SYSTOLIC BLOOD PRESSURE: 120 MMHG | DIASTOLIC BLOOD PRESSURE: 71 MMHG | SYSTOLIC BLOOD PRESSURE: 136 MMHG | DIASTOLIC BLOOD PRESSURE: 67 MMHG

## 2020-11-04 VITALS — DIASTOLIC BLOOD PRESSURE: 73 MMHG | SYSTOLIC BLOOD PRESSURE: 162 MMHG

## 2020-11-04 VITALS — SYSTOLIC BLOOD PRESSURE: 149 MMHG | DIASTOLIC BLOOD PRESSURE: 71 MMHG

## 2020-11-04 VITALS — SYSTOLIC BLOOD PRESSURE: 129 MMHG | DIASTOLIC BLOOD PRESSURE: 63 MMHG

## 2020-11-04 RX ADMIN — PANTOPRAZOLE SODIUM SCH MG: 40 TABLET, DELAYED RELEASE ORAL at 08:16

## 2020-11-04 RX ADMIN — CETIRIZINE HYDROCHLORIDE SCH MG: 10 TABLET, FILM COATED ORAL at 08:16

## 2020-11-04 RX ADMIN — ASPIRIN 81 MG SCH MG: 81 TABLET ORAL at 08:15

## 2020-11-04 RX ADMIN — BUPROPION HYDROCHLORIDE SCH MG: 300 TABLET, EXTENDED RELEASE ORAL at 08:16

## 2020-11-04 NOTE — NUR
DISCHARGE-Pt discharge to home with son. Ambulates to front, et gets into personal vehicle 
under own power. IV discontinued prior to DC. No oxygen demands. Belongings and discharge 
papers sent with patient. Education provided on discharge medications et stroke prevention 
at time of discharge.

## 2020-11-04 NOTE — DS
DATE OF DISCHARGE:  11/04/2020



HOSPITAL COURSE:  The patient is a 70-year-old  female patient who came

to the Emergency Room with a complaint of generalized weakness.  She stated that

she tried to get to the groomer dog, she missed direction, spent half an hour to

reach the place; however, the patient was able to drive back home with the dog

without any problem.  She also complained of generalized weakness all over.  She

could not stand up.  She denies any headache, chest pain, shortness of breath or

palpitation.  She denies any visual or auditory hallucination.  She had similar

episode a year ago.  She has been taking Benadryl for her allergy.  She was

extensively investigated in the Emergency Room and basically her nonenhanced CT

scan revealed no acute intracranial process.  She basically was started on

Zyrtec, continued on Protonix and Wellbutrin.  When I saw her today, she was

sitting at the edge of the bed comfortably in no apparent distress.  She denied

any complaint.  She stated that she is ready to go home.  She has been up and

about.  She has no more weakness, no dizziness or lightheadedness.  She has not

had any fall and would like to go home.



PHYSICAL EXAMINATION:

GENERAL:  When I examined her, she was pale, extremely cachectic, but not

jaundiced, cyanosed or thyromegaly.  No jugular venous distention.  No limb

edema.

VITAL SIGNS:  Her heart rate was 84, blood pressure was 129/63, temperature was

98.4, respiratory rate 20, and oxygen saturation was 94% on room air.  Her

orthostatics showed that her blood pressure standing was 162/73 with a heart

rate of 87, sitting was 136/71 with a heart rate of 89 and standing was 120/67

with a heart rate of 94.  However, the patient was asymptomatic.



LABORATORY DATA:  Showed a white cell count of 4400, hemoglobin 13.3, hematocrit

41, MCV 94 and platelet count 265,000 with normal manual differential.  Her

serum sodium was 143, potassium 3.6, chloride 105, bicarbonate 30, anion gap of

8, BUN 18, creatinine 0.7, estimated GFR was 83 mL per minute.  Her glucose was

100, calcium was 9.2.  Total bilirubin, AST, ALT, alkaline phosphatase were

normal.  Total protein 7, albumin was 3.6.



I have seen the patient, observed her, she was able to walk in the corridors

without any assistance or assistive devices.  She denied any dizziness or

lightheadedness and therefore, the patient was discharged home to continue on

her current medication that included alendronate 70 mg once a week, amlodipine 5

mg once a day, aspirin 81 mg once a day, atorvastatin 40 mg at bedtime,

Wellbutrin 300 mg daily.  Cetirizine for Zyrtec 10 mg at bedtime and Protonix 40

mg daily for gastroesophageal reflux disease.



FINAL DISCHARGE DIAGNOSES:

1.  Dizziness and weakness has resolved.

2.  Hypertension.

3.  Hyperlipidemia.

4.  Gastroesophageal reflux disease.

5.  Osteoarthritis.

6.  The patient is extremely cachectic with body mass index 14.4 kilograms per

square meter.  We discussed this finding and she said that she is feeling fine. 

She does not have any problem with anorexia.  She is eating well and does not

have any pain.  I recommended that she should follow with her primary care

physician ____ her marked weight loss.





______________________________

GODWIN TRUONG MD



DR:  CASSIE/frank  JOB#:  [id number] / [id number]

DD:  11/04/2020 14:21  DT:  11/04/2020 14:58

## 2020-11-04 NOTE — PN
DATE:  



SUBJECTIVE:  The patient denies any new medical or neurological complaints.



OBJECTIVE:

GENERAL:  Thin white female, not in acute distress.

VITAL SIGNS:  Blood pressure 149/71, respiratory rate 19, pulse is 82, oxygen

saturation 95% on room air, temperature 98.3.

HEENT:  Normocephalic and atraumatic, otherwise unremarkable.

NECK:  Supple.  Negative for carotid bruit, lymphadenopathy or thyromegaly.

LUNGS:  Clear to A and P.

CARDIOVASCULAR:  Regular rate and rhythm, normal S1 and S2.  There is no S3, S4

or murmur.

ABDOMEN:  Soft.  Bowel sounds positive.

EXTREMITIES:  Negative for cyanosis, clubbing or edema.

NEUROLOGICAL EXAM:  Mental Status:  The patient has normal mental status and

intact cranial nerves.  No focal motor or sensory deficit.  Deep tendon reflexes

were symmetric and hypoactive with absent Achilles responses.  Gait and

coordination are normal.



IMPRESSION:

1.  Generalized weakness, weight loss of unknown etiology, recent frequent falls

due to generalized weakness and possible postural hypotension.

2.  History of hyperlipidemia.

3.  Anxiety and moderate depressions.



RECOMMENDATIONS:

1.  Check blood pressure for orthostatic hypotension.

2.  Physical therapy evaluation and continue with previous neurological

recommendations.





______________________________

M JIMI UREÑA MD



DR:  MARLENA/frank  JOB#:  [id number] / [id number]

DD:  11/04/2020 09:52  DT:  11/04/2020 11:01

## 2020-11-30 ENCOUNTER — HOSPITAL ENCOUNTER (OUTPATIENT)
Dept: HOSPITAL 63 - NM | Age: 70
End: 2020-11-30
Attending: FAMILY MEDICINE
Payer: COMMERCIAL

## 2020-11-30 DIAGNOSIS — R63.4: ICD-10-CM

## 2020-11-30 DIAGNOSIS — K30: Primary | ICD-10-CM

## 2020-11-30 PROCEDURE — 78264 GASTRIC EMPTYING IMG STUDY: CPT

## 2020-11-30 PROCEDURE — A9541 TC99M SULFUR COLLOID: HCPCS

## 2020-11-30 NOTE — RAD
EXAM: Nuclear gastric emptying scan.

 

HISTORY: Nausea and vomiting.

 

COMPARISON: None.

 

TECHNIQUE: Serial static images were obtained over the stomach following 

oral administration of 2 mCi 99m-Tc sulfur colloid.

 

FINDINGS: The stomach empties into the small bowel without evidence of 

reflux in the area of the esophagus. There is 48 percent retained tracer 

activity within stomach at one hour (normal 34.8 percent to 91 percent). 

There is 47 percent retained tracer activity within stomach at 2 hours 

(normal 2.7 percent to 60 percent). There is 26 percent retained tracer 

activity within stomach at 3 hours (normal 0.5 percent to 28 percent). 

There is 11 percent retained tracer activity within stomach at 4 hours 

(normal 0.0 percent to 10 percent).

 

The estimated time for half emptying of gastric contents, i.e. 'gastric 

emptying time' is 52 minutes (normal is 66 +/- 22 minutes). 

 

IMPRESSION: Normal gastric emptying half-time and normal gastric emptying 

at 1 hour, 2 hours and 3 hours. There is slightly delayed gastric emptying

at 4 hours.

 

Electronically signed by: Leonora Sawant MD (11/30/2020 2:37 PM) [facility]

## 2022-11-27 NOTE — PATHOLOGY
[facility] Accession Number: [id number]

.                                                                01

Material submitted:                                        .

PART A: stomach - GASTRIC BIOPSY

PART B: small bowel - SMALL BOWEL BIOPSY

PART C: esophagus - IRREGULAR Z LINE BIOPSY

PART D: esophagus - ESOPHAGUS BIOPSY

.                                                                01

Clinical history:                                          .

None provided.

.                                                                02

**********************************************************************

Diagnosis:

A. Gastric biopsies:

- Chronic gastritis, mild, with small focus of intestinal metaplasia.

.

B. Small bowel biopsies:

- No significant pathologic abnormalities.

.

C. Irregular Z-line biopsies:

- Segments of hyperplastic squamous esophageal and esophagogastric mucosa

showing chronic inflammation, and segment of specialized columnar

epithelium showing intestinal metaplasia with goblet cells consistent with

Ernst's change.

.

D. Esophageal biopsies:

- Segments of mildly hyperplastic squamous esophageal mucosa.

(JPM:pit 07/27/2020)

[facility]  07/27/2020  1725 Local

**********************************************************************

.                                                                02

Comment:

 Sections of the gastric biopsy reveal segments of gastric antral mucosa

which show congestion, mild chronic inflammation, and a small focus of

intestinal metaplasia. A properly controlled immunoperoxidase stain for

Helicobacter is negative for Helicobacter organisms.

.

 Sections of the small bowel biopsy reveal segments of duodenal and

small intestine mucosa.  Where best oriented, the mucosal villi show no

sprue-like changes or significant inflammatory changes.

.

 Sections of the irregular Z-line biopsy reveal segments of hyperplastic

squamous esophageal and esophagogastric mucosa showing moderate chronic

inflammation, in addition to a segment of columnar lined mucosa showing

mild chronic inflammation and intestinal metaplasia with goblet cells

consistent with Ernst's change.  There is no dysplasia or evidence of

malignancy.

.

 Sections of the esophageal biopsy reveal segments of focally tangentially

oriented mildly hyperplastic squamous esophageal mucosa. There is no

evidence of an eosinophilic esophagitis.  There is no evidence of

Ernst's change, dysplasia or malignancy. (JPM:pit 07/27/2020)

.

Special stain performed:  Immunoperoxidase for Helicobacter on A1.

.                                                                02

Electronically signed:                                     .

Isak Pierre MD, Pathologist

NPI- [id number]

.                                                                01

Gross description:                                         .

A.  Received in formalin labeled "Christina Bruce, gastric for H. pylori"

is a 0.5 x 0.4 x 0.1 cm aggregate of tan-brown soft tissue fragments. The

specimen is submitted entirely in A1.

.

B.  Received in formalin labeled "Christina Bruce, small bowel rule out

sprue" is a 0.7 x 0.7 x 0.1 cm aggregate of tan-brown soft tissue

fragments. The specimen is submitted entirely in B1.

.

C.  Received in formalin labeled "Christina Bruce, irregular Z line for hx

of Ernst's" is a 1.0 x 0.6 x 0.1 cm aggregate of tan-brown soft tissue

fragments. The specimen is submitted entirely in C1.

.

D.  Received in formalin labeled "Christina Bruce, esophagus BX rule out

eosinophilic esophagitis" is a 0.8 x 0.5 x 0.1 cm aggregate of tan-brown

soft tissue fragments. The specimen is submitted entirely in D1.  ([facility] [place];

7/26/2020)

[facility]  07/27/2020  1045 Local

.                                                                02

Pathologist provided ICD-10:

K29.50, K22.70

.                                                                02

CPT                                                        .

577538, 705256, 513592, [id number], [id number]

Specimen Comment: A courtesy copy of this report has been sent to [phone]

Specimen Comment: [id number]

Specimen Comment: Report sent to  / DR WARE

***Performed at:  01

   [facility]

   [address], [place]  [id number]

   MD Raciel Garrett MD Phone:  [phone]

***Performed at:  02

   [facility]

   [address], [place]  [id number]

   MD Isak Pierre MD Phone:  [phone]
[facility]...